# Patient Record
Sex: FEMALE | Race: WHITE | NOT HISPANIC OR LATINO | Employment: FULL TIME
[De-identification: names, ages, dates, MRNs, and addresses within clinical notes are randomized per-mention and may not be internally consistent; named-entity substitution may affect disease eponyms.]

---

## 2017-06-03 ENCOUNTER — HEALTH MAINTENANCE LETTER (OUTPATIENT)
Age: 47
End: 2017-06-03

## 2019-09-29 ENCOUNTER — HEALTH MAINTENANCE LETTER (OUTPATIENT)
Age: 49
End: 2019-09-29

## 2021-01-14 ENCOUNTER — HEALTH MAINTENANCE LETTER (OUTPATIENT)
Age: 51
End: 2021-01-14

## 2021-03-14 ENCOUNTER — HEALTH MAINTENANCE LETTER (OUTPATIENT)
Age: 51
End: 2021-03-14

## 2021-10-23 ENCOUNTER — HEALTH MAINTENANCE LETTER (OUTPATIENT)
Age: 51
End: 2021-10-23

## 2022-02-12 ENCOUNTER — HEALTH MAINTENANCE LETTER (OUTPATIENT)
Age: 52
End: 2022-02-12

## 2022-04-09 ENCOUNTER — HEALTH MAINTENANCE LETTER (OUTPATIENT)
Age: 52
End: 2022-04-09

## 2022-10-10 ENCOUNTER — HEALTH MAINTENANCE LETTER (OUTPATIENT)
Age: 52
End: 2022-10-10

## 2023-03-25 ENCOUNTER — HEALTH MAINTENANCE LETTER (OUTPATIENT)
Age: 53
End: 2023-03-25

## 2023-05-27 ENCOUNTER — HEALTH MAINTENANCE LETTER (OUTPATIENT)
Age: 53
End: 2023-05-27

## 2023-07-07 ENCOUNTER — OFFICE VISIT (OUTPATIENT)
Dept: OBGYN | Facility: CLINIC | Age: 53
End: 2023-07-07
Payer: COMMERCIAL

## 2023-07-07 VITALS
WEIGHT: 152.6 LBS | OXYGEN SATURATION: 100 % | DIASTOLIC BLOOD PRESSURE: 72 MMHG | HEART RATE: 70 BPM | SYSTOLIC BLOOD PRESSURE: 111 MMHG | BODY MASS INDEX: 22.6 KG/M2 | HEIGHT: 69 IN

## 2023-07-07 DIAGNOSIS — E89.0 H/O PARTIAL THYROIDECTOMY: ICD-10-CM

## 2023-07-07 DIAGNOSIS — Z01.419 WELL WOMAN EXAM WITH ROUTINE GYNECOLOGICAL EXAM: Primary | ICD-10-CM

## 2023-07-07 DIAGNOSIS — Z12.11 COLON CANCER SCREENING: ICD-10-CM

## 2023-07-07 DIAGNOSIS — Z13.220 SCREENING CHOLESTEROL LEVEL: ICD-10-CM

## 2023-07-07 DIAGNOSIS — Z12.4 SCREENING FOR MALIGNANT NEOPLASM OF CERVIX: ICD-10-CM

## 2023-07-07 DIAGNOSIS — L98.9 SKIN LESION: ICD-10-CM

## 2023-07-07 DIAGNOSIS — Z13.29 SCREENING FOR THYROID DISORDER: ICD-10-CM

## 2023-07-07 DIAGNOSIS — I82.4Y1 ACUTE DEEP VEIN THROMBOSIS (DVT) OF PROXIMAL VEIN OF RIGHT LOWER EXTREMITY (H): ICD-10-CM

## 2023-07-07 DIAGNOSIS — Z11.3 ROUTINE SCREENING FOR STI (SEXUALLY TRANSMITTED INFECTION): ICD-10-CM

## 2023-07-07 DIAGNOSIS — Z12.31 ENCOUNTER FOR SCREENING MAMMOGRAM FOR BREAST CANCER: ICD-10-CM

## 2023-07-07 DIAGNOSIS — Z13.1 SCREENING FOR DIABETES MELLITUS: ICD-10-CM

## 2023-07-07 PROBLEM — I82.409 ACUTE DEEP VEIN THROMBOSIS (DVT) OF LOWER EXTREMITY (H): Status: ACTIVE | Noted: 2023-07-07

## 2023-07-07 LAB
CHOLEST SERPL-MCNC: 275 MG/DL
HBA1C MFR BLD: 5.3 % (ref 0–5.6)
HDLC SERPL-MCNC: 90 MG/DL
LDLC SERPL CALC-MCNC: 169 MG/DL
NONHDLC SERPL-MCNC: 185 MG/DL
T4 FREE SERPL-MCNC: 0.93 NG/DL (ref 0.9–1.7)
TRIGL SERPL-MCNC: 80 MG/DL
TSH SERPL DL<=0.005 MIU/L-ACNC: 4.63 UIU/ML (ref 0.3–4.2)

## 2023-07-07 PROCEDURE — 84443 ASSAY THYROID STIM HORMONE: CPT

## 2023-07-07 PROCEDURE — 36415 COLL VENOUS BLD VENIPUNCTURE: CPT

## 2023-07-07 PROCEDURE — 84439 ASSAY OF FREE THYROXINE: CPT

## 2023-07-07 PROCEDURE — 87491 CHLMYD TRACH DNA AMP PROBE: CPT

## 2023-07-07 PROCEDURE — 83036 HEMOGLOBIN GLYCOSYLATED A1C: CPT

## 2023-07-07 PROCEDURE — 87591 N.GONORRHOEAE DNA AMP PROB: CPT

## 2023-07-07 PROCEDURE — 87624 HPV HI-RISK TYP POOLED RSLT: CPT

## 2023-07-07 PROCEDURE — G0145 SCR C/V CYTO,THINLAYER,RESCR: HCPCS

## 2023-07-07 PROCEDURE — 99386 PREV VISIT NEW AGE 40-64: CPT

## 2023-07-07 PROCEDURE — 80061 LIPID PANEL: CPT

## 2023-07-07 RX ORDER — ESCITALOPRAM OXALATE 10 MG/1
TABLET ORAL
COMMUNITY
Start: 2023-02-01

## 2023-07-07 NOTE — PROGRESS NOTES
SUBJECTIVE:  Bernarda Escobar is an 53 year old, , who presents for an Annual Preventive Well Woman Exam.     Concerns patient would like to discuss today:   STI screening - Asymptomatic, new sexual partners    GYNECOLOGIC HISTORY:    Her LMP was at age 50.   Patient is currently sexually active with multiple male partner(s).   Patient denies history of STI or PID.    Patient is currently using menopause for contraception.   She does desire STI testing at her visit today.  Patient denies concerns regarding sexual function including concerns regarding sexual arousal, orgasm, or dyspareunia.   Patient denies problems with urination.  Patient denies bowel problems.    Last PAP: Uncertain, possibly 5 years ago  Next PAP due: Overdue per patient  History of abnormal Pap smear: Yes, has had cold knife cone biopsy previously    Mammogram current: yes  Last Mammogram:   MA Screen with Implants Digital Bilat    Result Date: 10/6/2022  Narrative: [facility] MAMMOGRAM SCREENING BILAT W IMPLANTS W CAD performed on 10/6/22 Compared to: 2019 [facility] Mammogram Diag Bilat W Implants W 3D Travon, 2018 [facility] Mammogram Screening Bilat W Implants W CAD, and 2017 [facility] Mammogram Diag Bilat W Implants W Travon   FINDINGS: Bilateral screening mammogram was performed with the assistance of Computer-Aided Detection . The breasts have scattered areas of fibroglandular density. There are findings of breast augmentation. There is no radiographic evidence of malignancy.      Regular self breast exam: Yes  Personal history of breast cancer: No  Family history of breast cancer: No    Personal history of uterine, ovarian, or colon cancer: No  Family history of uterine or ovarian cancer: Yes  Family history of colon cancer: Yes      HEALTH MAINTENANCE HISTORY:    Last Cholesterol:  No results found for: CHOL    Last TSH:  No results found for: TSH     Last Colon Cancer Screening: None prior    Are immunizations up to date: Yes    SOCIAL  HISTORY:  Have you had an eye exam in the last 2 years? Yes  Do you see your dentist at least 1 time per year? No  Do you consume dairy products? Yes  Do you consume meat products? Yes    Do you exercise regularly? Yes    Patient denies tobacco use.  Patient reports 1 drik daily, never more than 4 on spcial occasion alcohol use.    Last PHQ-2 score on record:      2023     2:12 PM   PHQ-2 (  [employer])   Q1: Little interest or pleasure in doing things 0   Q2: Feeling down, depressed or hopeless 0   PHQ-2 Score 0      Last PHQ-9 score on record:        No data to display              Last GAD7 score on record:        No data to display                Do you feel safe where you live? Yes      HISTORY:  Prescription Medications as of 2023       Rx Number Disp Refills Start End Last Dispensed Date Next Fill Date Owning Pharmacy    escitalopram (LEXAPRO) 10 MG tablet    2023        Class: Historical    Levothyroxine Sodium (SYNTHROID PO)            Class: Historical    Route: Oral    Rivaroxaban (XARELTO PO)            Class: Historical    Route: Oral        No Known Allergies  Immunization History   Administered Date(s) Administered     COVID-19 Bivalent 18+ (Moderna) 2022     COVID-19 Monovalent 18+ (Moderna) 2021     COVID-19 Vaccine (Anamaria) 2021       OB History    Para Term  AB Living   3 3 2 1 0 4   SAB IAB Ectopic Multiple Live Births   0 0 0 1 4     Past Medical History:   Diagnosis Date     Thyroid disease      History reviewed. No pertinent surgical history.  History reviewed. No pertinent family history.  Social History     Socioeconomic History     Marital status:      Spouse name: None     Number of children: None     Years of education: None     Highest education level: None   Tobacco Use     Smoking status: Never   Vaping Use     Vaping Use: Never used   Substance and Sexual Activity     Alcohol use: Yes     Comment: weekly     Drug use: No     Sexual  "activity: Yes     Partners: Male     Birth control/protection: None      reports that she has never smoked. She does not have any smokeless tobacco history on file.    REVIEW of SYSTEMS:  ROS: 10 point ROS neg other than the symptoms noted above in the HPI.    EXAM:  Blood pressure 111/72, pulse 70, height 1.74 m (5' 8.5\"), weight 69.2 kg (152 lb 9.6 oz), SpO2 100 %.   Body mass index is 22.86 kg/m .  General - pleasant female in no acute distress.  Skin - POSITIVE for 3 mm brown lesion to right, upper, lateral back. Lesion is rounded, slightly raised, has mild variation in pigmentation and is non-pruritic. Otherwise, no suspicious body lesions or rashes  EENT-  PERRLA,   Neck - supple without lymphadenopathy, euthyroid with out palpable nodules  Lungs - clear to auscultation bilaterally.  Heart - regular rate and rhythm without murmur.  Abdomen - soft, nontender, nondistended, no masses or organomegaly noted.  Musculoskeletal - no gross deformities.  Neurological - normal strength, sensation, and mental status.    Breast Exam:  Breast: Without visible skin changes. No dimpling or lesions seen.   Breasts supple, non-tender with palpation, no dominant mass, nodularity, or nipple discharge noted bilaterally. Axillary nodes negative.      Pelvic Exam:  EG/BUS: Normal genital architecture without lesions, erythema or abnormal secretions Bartholin's, Urethra, [place]'s normal   Urethral meatus: normal   Urethra: no masses, tenderness, or scarring   Bladder: no masses or tenderness   Vagina: atrophic, thin, dry with creamy, white and odorless  secretions  Cervix: no lesions and pink, moist, closed, without lesion or CMT  Uterus: anteverted,  and small, smooth, firm, mobile w/o pain  Adnexa: Within normal limits and No masses, nodularity, tenderness  Rectum: deferred       ASSESSMENT/PLAN:     (Z01.419) Well woman exam with routine gynecological exam  (primary encounter diagnosis)  (I82.4Y1) Acute deep vein thrombosis (DVT) " of proximal vein of right lower extremity (H)  (E89.0) H/O partial thyroidectomy  Comment: Well woman exam in female with history of partial thyroidectomy and distant hx of DVT with short course of Xarelto. She desires STI screening and update of PAP as well as routine wellness screenings and evaluation of a skin lesion on her R upper back.  Plan: Pap imaged thin layer screen with HPV -         recommended age 30 - 65 years (select HPV order        below), TSH with free T4 reflex, NEISSERIA         GONORRHOEA PCR, CHLAMYDIA TRACHOMATIS PCR,         Colonoscopy Screening  Referral, MA         Screen with Implants Bilateral w/Travon, T4 free    (Z12.4) Screening for malignant neoplasm of cervix  Comment: Update of PAP, routine screening.  Plan: Pap imaged thin layer screen with HPV -         recommended age 30 - 65 years (select HPV order        below)    (Z12.11) Colon cancer screening  Comment: No previous colon cancer screening completed in 53 year old female.  Plan: Colonoscopy Screening  Referral    (Z13.29) Screening for thyroid disorder  Comment: Hx of partial thyroidectomy. Not currently taking synthroid.   Plan:    TSH with free T4 reflex,    T4 free    (Z11.3) Routine screening for STI (sexually transmitted infection)  Comment: New sexual partner, asymptomatic. Routine screening.  Plan:    NEISSERIA GONORRHOEA PCR,    CHLAMYDIA TRACHOMATIS PCR    (Z12.31) Encounter for screening mammogram for breast cancer  Comment: Routine screening.  Plan: MA Screen with Implants Bilateral w/Travon    (Z13.1) Screening for diabetes mellitus  Comment: Routine screening.  Plan: Hemoglobin A1c    (Z13.220) Screening cholesterol level  Comment: Routine screening.  Plan: Lipid panel reflex to direct LDL Non-fasting    (L98.9) Skin lesion  Comment: 3 mm brown lesion to right, upper, lateral back. Lesion is rounded, slightly raised, has mild variation in pigmentation and is non-pruritic.  Plan: Adult Dermatology  "Referral    Additional teaching done at this visit regarding calcium (1200 mg per day), self breast exam and exercise.      STD testing offered?  Accepted    Diet: Specific dietary guidelines may vary, but in general, a healthy diet includes intake of fruits, vegetables, legumes, nuts, and whole grains each day. A healthy diet includes a variety of protein-rich foods which might include lean cuts of meat (turkey, chicken), tofu, legumes, beans, nuts, seeds, dairy, and some fish. You should limit or avoid red and processed meats, unhealthy fats (saturated and trans-fats), sugar, sodium, and alcohol.      Exercise: The American Heart Association recommends you get at least 150 minutes of moderate-intensity aerobic exercise per week, spread across several days. They also recommend women participate in strength-training exercises (such as resistance or weights) 2 days each week.    Calcium and Vitamin D: We recommend you try to get 9800-5610 mg of calcium (total of diet and supplement) and 600-800 international units of vitamin D daily. We recommend 3 servings of calcium and vitamin D-rich foods daily to achieve this.  Good sources of calcium include:    Milk, yogurt, and cheese    Certain green leafy vegetables, such as kale, spinach, bok jaya, and brian greens    Fish with bones, such as canned salmon, mackerel, and sardines    Tofu made with calcium carbonate (not the type of tofu called nagiri)    Drinks that have calcium added, such as some orange juice and rice and soy drinks  Good dietary sources of include:    Milk, fortified with Vitamin D    Health Maintenance  The following topics were discussed or recommended:  - Kegel exercises  - Seat belt use, helmet use, and sunscreen use  - Routine vision screening  - Twice-annual dental visits  - Mammogram, per guidelines and provider recommendations  - Cervical Cancer Screenings (\"PAP Smear\") per guidelines and provider recommendations  - Colon Cancer Screenings at " age 45 (may be recommended earlier if you have a family history of colon cancer/disease)  - Calcium/Vitamin D supplement: Recommend 5607-7412 mg of calcium daily and 600-800 IU of vitamin D (total of diet and supplement).  - Folic Acid 400 - 800 mcg daily for women of childbearing age to prevent neural tube defects       Return to clinic in one year.  Follow-up as needed.    INDIGO Mendoza CNP

## 2023-07-08 PROBLEM — E89.0 H/O PARTIAL THYROIDECTOMY: Status: ACTIVE | Noted: 2023-07-08

## 2023-07-08 LAB
C TRACH DNA SPEC QL NAA+PROBE: NEGATIVE
N GONORRHOEA DNA SPEC QL NAA+PROBE: NEGATIVE

## 2023-07-08 NOTE — PATIENT INSTRUCTIONS
If you have any questions regarding your visit, please contact your care team.     [facility]: [phone]  Women s Health CLINIC HOURS TELEPHONE NUMBER     Eva Muñoz, RAVINDER, APRN, WHNP-BC     - Certified Medical Assistant    Anna - ISAURA Chowdhury - ISAURA Paiz - ISAURA Cardoza -   Lianna -     Monday- Friday: [place]  8:00 a.m - 4:00 p.m         [facility]  [address] [place] [address]  Phone: [phone]   Fax: [phone]   Imaging Scheduling- [phone]    [facility]  [address] [place] [address]  Phone: [phone]  Language Assist Line: [phone]  Fax:   Imaging Scheduling- [phone]    [facility]  [address] [place] [address]   [place] [address] [phone]     **Surgeries** Our Surgery Schedulers will contact you to schedule. If you do not receive a call within 3 business days, please call [phone].    Urgent Care locations:  [facility] Monday-Friday  10 am - 8 pm  Saturday and Sunday   9 am - 5 pm  Monday-Friday   10 am - 8 pm  Saturday and Sunday   9 am - 5 pm   [phone]     If you need a medication refill, please contact your pharmacy. Please allow 3 business days for your refill to be completed.  As always, Thank you for trusting us with your healthcare needs!    see additional instructions from your care team below     Routine Well Woman Recommendations    Diet: Specific dietary guidelines may vary, but in general, a healthy diet includes intake of fruits, vegetables, legumes, nuts, and whole grains each day. A healthy diet includes a variety of protein-rich foods which might include lean cuts of meat (turkey, chicken), tofu, legumes, beans, nuts, seeds, dairy, and some fish. You should limit or avoid red and processed meats, unhealthy fats (saturated and trans-fats), sugar, sodium, and alcohol.      Exercise: The  "American Heart Association recommends you get at least 150 minutes of moderate-intensity aerobic exercise per week, spread across several days. They also recommend women participate in strength-training exercises (such as resistance or weights) 2 days each week.    Calcium and Vitamin D: We recommend you try to get 3867-1320 mg of calcium (total of diet and supplement) and 600-800 international units of vitamin D daily. We recommend 3 servings of calcium and vitamin D-rich foods daily to achieve this.  Good sources of calcium include:  Milk, yogurt, and cheese  Certain green leafy vegetables, such as kale, spinach, bok jaya, and brian greens  Fish with bones, such as canned salmon, mackerel, and sardines  Tofu made with calcium carbonate (not the type of tofu called nagiri)  Drinks that have calcium added, such as some orange juice and rice and soy drinks  Good dietary sources of include:  Milk, fortified with Vitamin D    Health Maintenance  The following topics were discussed or recommended:  - Kegel exercises  - Seat belt use, helmet use, and sunscreen use  - Routine vision screening  - Twice-annual dental visits  - Mammogram, per guidelines and provider recommendations  - Cervical Cancer Screenings (\"PAP Smear\") per guidelines and provider recommendations  - Colon Cancer Screenings at age 45 (may be recommended earlier if you have a family history of colon cancer/disease)  - Calcium/Vitamin D supplement: Recommend 7438-6937 mg of calcium daily and 600-800 IU of vitamin D (total of diet and supplement).  - Folic Acid 400 - 800 mcg daily for women of childbearing age to prevent neural tube defects       Return to clinic in one year.  Follow-up as needed.    "

## 2023-07-11 LAB
BKR LAB AP GYN ADEQUACY: NORMAL
BKR LAB AP GYN INTERPRETATION: NORMAL
BKR LAB AP HPV REFLEX: NORMAL
BKR LAB AP PREVIOUS ABNL DX: NORMAL
BKR LAB AP PREVIOUS ABNORMAL: NORMAL
PATH REPORT.COMMENTS IMP SPEC: NORMAL
PATH REPORT.COMMENTS IMP SPEC: NORMAL
PATH REPORT.RELEVANT HX SPEC: NORMAL

## 2023-07-13 LAB
HUMAN PAPILLOMA VIRUS 16 DNA: NEGATIVE
HUMAN PAPILLOMA VIRUS 18 DNA: NEGATIVE
HUMAN PAPILLOMA VIRUS FINAL DIAGNOSIS: NORMAL
HUMAN PAPILLOMA VIRUS OTHER HR: NEGATIVE

## 2023-09-14 ENCOUNTER — TELEPHONE (OUTPATIENT)
Dept: GASTROENTEROLOGY | Facility: CLINIC | Age: 53
End: 2023-09-14
Payer: COMMERCIAL

## 2023-09-14 NOTE — TELEPHONE ENCOUNTER
"Endoscopy Scheduling Screen    Have you had a positive Covid test in the last 14 days?  No    Are you active on MyChart?   Yes    What insurance is in the chart?  Other:      Ordering/Referring Provider:   JAKOB SANCHEZ        (If ordering provider performs procedure, schedule with ordering provider unless otherwise instructed. )    BMI: Estimated body mass index is 22.86 kg/m  as calculated from the following:    Height as of 7/7/23: 1.74 m (5' 8.5\").    Weight as of 7/7/23: 69.2 kg (152 lb 9.6 oz).     Sedation Ordered  moderate sedation.   If patient BMI > 50 do not schedule in [facility].    If patient BMI > 45 do not schedule at [facility].    Are you taking methadone or Suboxone?  No    Are you taking any prescription medications for pain 3 or more times per week?   No    Do you have a history of malignant hyperthermia or adverse reaction to anesthesia?  No    (Females) Are you currently pregnant?   No     Have you been diagnosed or told you have pulmonary hypertension?   No    Do you have an LVAD?  No    Have you been told you have moderate to severe sleep apnea?  No    Have you been told you have COPD, asthma, or any other lung disease?  No    Do you have any heart conditions?  No     Have you ever had an organ transplant?   No    Have you ever had or are you awaiting a heart or lung transplant?   No    Have you had a stroke or transient ischemic attack (TIA aka \"mini stroke\" in the last 6 months?   No    Have you been diagnosed with or been told you have cirrhosis of the liver?   No    Are you currently on dialysis?   No    Do you need assistance transferring?   No    BMI: Estimated body mass index is 22.86 kg/m  as calculated from the following:    Height as of 7/7/23: 1.74 m (5' 8.5\").    Weight as of 7/7/23: 69.2 kg (152 lb 9.6 oz).     Is patients BMI > 40 and scheduling location [facility]?  No    Do you take an injectable medication for weight loss or diabetes (excluding insulin)?  No    Do you take the " medication Naltrexone?  No    Do you take blood thinners?  No       Prep   Are you currently on dialysis or do you have chronic kidney disease?  No    Do you have a diagnosis of diabetes?  No    Do you have a diagnosis of cystic fibrosis (CF)?  No    On a regular basis do you go 3 -5 days between bowel movements?  No    BMI > 40?  No    Preferred Pharmacy:    [id number] - [place], [place] - [address] [address] AT [facility] OF  & [place] [facility]  [address] [place] [address]  [place] [address]  Phone: [phone] Fax: [phone]      Final Scheduling Details   Colonoscopy prep sent?  Standard MiraLAX    Procedure scheduled  Colonoscopy    Surgeon:  JUSTINA     Date of procedure:  11/09/2023     Pre-OP / PAC:   No - Not required for this site.    Location  [facility] - [facility] - Patient preference.    Sedation   Moderate Sedation - Per order.      Patient Reminders:   You will receive a call from a Nurse to review instructions and health history.  This assessment must be completed prior to your procedure.  Failure to complete the Nurse assessment may result in the procedure being cancelled.      On the day of your procedure, please designate an adult(s) who can drive you home stay with you for the next 24 hours. The medicines used in the exam will make you sleepy. You will not be able to drive.      You cannot take public transportation, ride share services, or non-medical taxi service without a responsible caregiver.  Medical transport services are allowed with the requirement that a responsible caregiver will receive you at your destination.  We require that drivers and caregivers are confirmed prior to your procedure.

## 2023-10-30 ENCOUNTER — TELEPHONE (OUTPATIENT)
Dept: GASTROENTEROLOGY | Facility: CLINIC | Age: 53
End: 2023-10-30
Payer: COMMERCIAL

## 2023-10-30 NOTE — TELEPHONE ENCOUNTER
Pre visit planning completed.      Procedure details:    Patient scheduled for Colonoscopy  on 11.9.2023.     Arrival time: 0945. Procedure time 1030    Pre op exam needed? N/A    Facility location: [facility] [place]; [address], [place] [address]    Sedation type: Conscious sedation     Indication for procedure: screening colonoscpy      Chart review:     Electronic implanted devices? No    Recent diagnosis of diverticulitis within the last 6 weeks? No    Diabetic? No    Diabetic medication HOLDING recommendations: (if applicable)  Oral diabetic medications: N/A  Diabetic injectables: N/A  Insulin: N/A      Medication review:    Anticoagulants? No    NSAIDS? No NSAID medications per patient's medication list.  RN will verify with pre-assessment call.    Other medication HOLDING recommendations:  N/A      Prep for procedure:     Bowel prep recommendation: Standard Miralax   Due to:  standard bowel prep.    Prep instructions sent via [employer]         Juana Auguste RN  Endoscopy Procedure Pre Assessment RN  [phone] option 4

## 2023-10-30 NOTE — TELEPHONE ENCOUNTER
Pre assessment completed for upcoming procedure.   (Please see previous telephone encounter notes for complete details)    Patient  returned call.       Procedure details:    Arrival time and facility location reviewed.    Pre op exam needed? N/A    Designated  policy reviewed. Instructed to have someone stay 6 hours post procedure.     COVID policy reviewed.      Medication review:    Medications reviewed. Please see supporting documentation below. Holding recommendations discussed (if applicable).   N/A      Prep for procedure:     Procedure prep instructions reviewed.        Additional information needed?  N/A      Patient  verbalized understanding and had no questions or concerns at this time.      Eva Doss RN  Endoscopy Procedure Pre Assessment RN  [phone] option 4

## 2023-10-30 NOTE — TELEPHONE ENCOUNTER
Attempted to contact patient in order to complete pre assessment questions.     No answer. Left message to return call to [phone] option 4    Juana Auguste RN  Endoscopy Procedure Pre Assessment RN

## 2023-11-09 ENCOUNTER — HOSPITAL ENCOUNTER (OUTPATIENT)
Facility: AMBULATORY SURGERY CENTER | Age: 53
Discharge: HOME | End: 2023-11-09
Attending: INTERNAL MEDICINE | Admitting: INTERNAL MEDICINE
Payer: COMMERCIAL

## 2023-11-09 VITALS
OXYGEN SATURATION: 99 % | DIASTOLIC BLOOD PRESSURE: 77 MMHG | SYSTOLIC BLOOD PRESSURE: 106 MMHG | TEMPERATURE: 97.4 F | HEART RATE: 55 BPM | RESPIRATION RATE: 16 BRPM

## 2023-11-09 LAB — COLONOSCOPY: NORMAL

## 2023-11-09 PROCEDURE — G8907 PT DOC NO EVENTS ON DISCHARG: HCPCS

## 2023-11-09 PROCEDURE — 45378 DIAGNOSTIC COLONOSCOPY: CPT

## 2023-11-09 PROCEDURE — G8918 PT W/O PREOP ORDER IV AB PRO: HCPCS

## 2023-11-09 RX ORDER — NALOXONE HYDROCHLORIDE 0.4 MG/ML
0.2 INJECTION, SOLUTION INTRAMUSCULAR; INTRAVENOUS; SUBCUTANEOUS
Status: DISCONTINUED | OUTPATIENT
Start: 2023-11-09 | End: 2023-11-10 | Stop reason: HOSPADM

## 2023-11-09 RX ORDER — NALOXONE HYDROCHLORIDE 0.4 MG/ML
0.4 INJECTION, SOLUTION INTRAMUSCULAR; INTRAVENOUS; SUBCUTANEOUS
Status: DISCONTINUED | OUTPATIENT
Start: 2023-11-09 | End: 2023-11-10 | Stop reason: HOSPADM

## 2023-11-09 RX ORDER — ONDANSETRON 2 MG/ML
4 INJECTION INTRAMUSCULAR; INTRAVENOUS EVERY 6 HOURS PRN
Status: DISCONTINUED | OUTPATIENT
Start: 2023-11-09 | End: 2023-11-10 | Stop reason: HOSPADM

## 2023-11-09 RX ORDER — FENTANYL CITRATE 50 UG/ML
INJECTION, SOLUTION INTRAMUSCULAR; INTRAVENOUS PRN
Status: DISCONTINUED | OUTPATIENT
Start: 2023-11-09 | End: 2023-11-09 | Stop reason: HOSPADM

## 2023-11-09 RX ORDER — PROCHLORPERAZINE MALEATE 10 MG
10 TABLET ORAL EVERY 6 HOURS PRN
Status: DISCONTINUED | OUTPATIENT
Start: 2023-11-09 | End: 2023-11-10 | Stop reason: HOSPADM

## 2023-11-09 RX ORDER — FLUMAZENIL 0.1 MG/ML
0.2 INJECTION, SOLUTION INTRAVENOUS
Status: ACTIVE | OUTPATIENT
Start: 2023-11-09 | End: 2023-11-09

## 2023-11-09 RX ORDER — LIDOCAINE 40 MG/G
CREAM TOPICAL
Status: DISCONTINUED | OUTPATIENT
Start: 2023-11-09 | End: 2023-11-10 | Stop reason: HOSPADM

## 2023-11-09 RX ORDER — ONDANSETRON 2 MG/ML
4 INJECTION INTRAMUSCULAR; INTRAVENOUS
Status: DISCONTINUED | OUTPATIENT
Start: 2023-11-09 | End: 2023-11-10 | Stop reason: HOSPADM

## 2023-11-09 RX ORDER — ONDANSETRON 4 MG/1
4 TABLET, ORALLY DISINTEGRATING ORAL EVERY 6 HOURS PRN
Status: DISCONTINUED | OUTPATIENT
Start: 2023-11-09 | End: 2023-11-10 | Stop reason: HOSPADM

## 2023-11-09 RX ORDER — SODIUM CHLORIDE, SODIUM LACTATE, POTASSIUM CHLORIDE, CALCIUM CHLORIDE 600; 310; 30; 20 MG/100ML; MG/100ML; MG/100ML; MG/100ML
INJECTION, SOLUTION INTRAVENOUS CONTINUOUS
Status: DISCONTINUED | OUTPATIENT
Start: 2023-11-09 | End: 2023-11-10 | Stop reason: HOSPADM

## 2023-11-09 RX ADMIN — SODIUM CHLORIDE, SODIUM LACTATE, POTASSIUM CHLORIDE, CALCIUM CHLORIDE: 600; 310; 30; 20 INJECTION, SOLUTION INTRAVENOUS at 10:17

## 2023-11-09 NOTE — H&P
[facility] Anesthesia Pre-op History and Physical    Bernarda Lane MRN# [id number]   Age: 53 year old YOB: 1970            Date of Exam 11/9/2023         Primary care provider: Clinic, [facility] [place] [facility] [place]         Chief Complaint and/or Reason for Procedure:     CRC screening - first colonoscopy         Active problem list:     Patient Active Problem List    Diagnosis Date Noted    H/O partial thyroidectomy 07/08/2023     Priority: Medium    Acute deep vein thrombosis (DVT) of lower extremity (H) 07/07/2023     Priority: Medium            Medications (include herbals and vitamins):   Any Plavix use in the last 7 days? No     Current Outpatient Medications   Medication Sig    escitalopram (LEXAPRO) 10 MG tablet      Current Facility-Administered Medications   Medication    lactated ringers infusion    lidocaine (LMX4) kit    lidocaine 1 % 0.1-1 mL    ondansetron (ZOFRAN) injection 4 mg    sodium chloride (PF) 0.9% PF flush 3 mL    sodium chloride (PF) 0.9% PF flush 3 mL             Allergies:    No Known Allergies  Allergy to Latex?   Allergy to tape?   No  Intolerances:             Physical Exam:   All vitals have been reviewed  Patient Vitals for the past 8 hrs:   BP Temp Temp src Pulse Resp SpO2   11/09/23 1008 110/74 97.4  F (36.3  C) Temporal 59 16 99 %     No intake/output data recorded.  Lungs:   No increased work of breathing, good air exchange, clear to auscultation bilaterally, no crackles or wheezing     Cardiovascular:   normal S1 and S2             Lab / Radiology Results:            Anesthetic risk and/or ASA classification:   2    Alexia Berkowitz DO

## 2024-01-07 ENCOUNTER — HEALTH MAINTENANCE LETTER (OUTPATIENT)
Age: 54
End: 2024-01-07

## 2024-02-01 ENCOUNTER — OFFICE VISIT (OUTPATIENT)
Dept: FAMILY MEDICINE | Facility: CLINIC | Age: 54
End: 2024-02-01
Payer: COMMERCIAL

## 2024-02-01 DIAGNOSIS — L82.1 SEBORRHEIC KERATOSIS: ICD-10-CM

## 2024-02-01 PROCEDURE — 99202 OFFICE O/P NEW SF 15 MIN: CPT | Performed by: PHYSICIAN ASSISTANT

## 2024-02-01 NOTE — LETTER
2/1/2024         RE: Bernarda Lane  [address] [place] [address]        Dear Colleague,    Thank you for referring your patient, Bernarda Lane, to the [facility]. Please see a copy of my visit note below.    [facility] Dermatology Note  Encounter Date: Feb 1, 2024  Office Visit      Dermatology Problem List:  1. Seborrheic keratosis, right posterior shoulder  ____________________________________________    Assessment & Plan:  # Seborrheic keratosis, non irritated.   - Discussed the natural history and benign nature of this lesion. Reassurance provided that no additional treatment is necessary.       Follow-up: prn for new or changing lesions    Staff and scribe     Scribe Disclosure:   I, MIRANDA RIVERA, am serving as a scribe; to document services personally performed by Laura Barton PA-C -based on data collection and the provider's statements to me.     Provider Disclosure:  I agree with above History, Review of Systems, Physical exam and Plan.  I have reviewed the content of the documentation and have edited it as needed. I have personally performed the services documented here and the documentation accurately represents those services and the decisions I have made.      Electronically signed by:  Delilah Holland PA-C  [facility]   Dermatology     All risks, benefits and alternatives were discussed with patient.  Patient is in agreement and understands the assessment and plan.  All questions were answered.      Laura Barton PA-C, MPAS  [facility]: Phone: [phone], Fax: [phone]  [facility]: Phone: [phone],  Fax: [phone]  [facility]: Phone: [phone], Fax: [phone]        CC: Derm Problem (Spot only check back)      Reviewed patients past medical history and pertinent chart review prior to patient's  visit today.     HPI:  Ms. Bernarda Lane is a 53 year old female who presents today as a new patient for spot check.     Today patient reported a spot of concern on right posterior shoulder. This lesion is new over the post year and is asymptomatic.    Patient is otherwise feeling well, without additional concerns.    Labs:  N/A    Physical Exam:  Vitals: There were no vitals taken for this visit.  SKIN: Focused examination of back was performed.   - right posterior shoulder, waxy, stuck on tan/brown papules and patches  - No other lesions of concern on areas examined.     Medications:  Current Outpatient Medications   Medication     escitalopram (LEXAPRO) 10 MG tablet     No current facility-administered medications for this visit.      Past Medical/Surgical History:   Patient Active Problem List   Diagnosis     Acute deep vein thrombosis (DVT) of lower extremity (H)     H/O partial thyroidectomy     Past Medical History:   Diagnosis Date     Thyroid disease                         Again, thank you for allowing me to participate in the care of your patient.        Sincerely,        Laura Barton PA-C

## 2024-02-01 NOTE — PATIENT INSTRUCTIONS
Patient Education       Proper skin care from [facility]:    -Eliminate harsh soaps as they strip the natural oils from the skin, often resulting in dry itchy skin ( i.e. Dial, Zest, [place] Spring)  -Use mild soaps such as Cetaphil or Dove Sensitive Skin in the shower. You do not need to use soap on arms, legs, and trunk every time you shower unless visibly soiled.   -Avoid hot or cold showers.  -After showering, lightly dry off and apply moisturizing within 2-3 minutes. This will help trap moisture in the skin.   -Aggressive use of a moisturizer at least 1-2 times a day to the entire body (including -Vanicream, Cetaphil, Aquaphor or Cerave) and moisturize hands after every washing.  -We recommend using moisturizers that come in a tub that needs to be scooped out, not a pump. This has more of an oil base. It will hold moisture in your skin much better than a water base moisturizer. The above recommended are non-pore clogging.      Wear a sunscreen with at least SPF 30 on your face, ears, neck and V of the chest daily. Wear sunscreen on other areas of the body if those areas are exposed to the sun throughout the day. Sunscreens can contain physical and/or chemical blockers. Physical blockers are less likely to clog pores, these include zinc oxide and titanium dioxide. Reapply every two hour and after swimming.     Sunscreen examples: https://www.ewg.org/sunscreen/    UV radiation  UVA radiation remains constant throughout the day and throughout the year. It is a longer wavelength than UVB and therefore penetrates deeper into the skin leading to immediate and delayed tanning, photoaging, and skin cancer. 70-80% of UVA and UVB radiation occurs between the hours of 10am-2pm.  UVB radiation  UVB radiation causes the most harmful effects and is more significant during the summer months. However, snow and ice can reflect UVB radiation leading to skin damage during the winter months as well. UVB radiation is  responsible for tanning, burning, inflammation, delayed erythema (pinkness), pigmentation (brown spots), and skin cancer.     I recommend self monthly full body exams and yearly full body exams with a dermatology provider. If you develop a new or changing lesion please follow up for examination. Most skin cancers are pink and scaly or pink and pearly. However, we do see blue/brown/black skin cancers.  Consider the ABCDEs of melanoma when giving yourself your monthly full body exam ( don't forget the groin, buttocks, feet, toes, etc). A-asymmetry, B-borders, C-color, D-diameter, E-elevation or evolving. If you see any of these changes please follow up in clinic. If you cannot see your back I recommend purchasing a hand held mirror to use with a larger wall mirror.       Checking for Skin Cancer  You can find cancer early by checking your skin each month. There are 3 kinds of skin cancer. They are melanoma, basal cell carcinoma, and squamous cell carcinoma. Doing monthly skin checks is the best way to find new marks or skin changes. Follow the instructions below for checking your skin.   The ABCDEs of checking moles for melanoma   Check your moles or growths for signs of melanoma using ABCDE:   Asymmetry: the sides of the mole or growth don t match  Border: the edges are ragged, notched, or blurred  Color: the color within the mole or growth varies  Diameter: the mole or growth is larger than 6 mm (size of a pencil eraser)  Evolving: the size, shape, or color of the mole or growth is changing (evolving is not shown in the images below)    Checking for other types of skin cancer  Basal cell carcinoma or squamous cell carcinoma have symptoms such as:     A spot or mole that looks different from all other marks on your skin  Changes in how an area feels, such as itching, tenderness, or pain  Changes in the skin's surface, such as oozing, bleeding, or scaliness  A sore that does not heal  New swelling or redness beyond  the border of a mole    Who s at risk?  Anyone can get skin cancer. But you are at greater risk if you have:   Fair skin, light-colored hair, or light-colored eyes  Many moles or abnormal moles on your skin  A history of sunburns from sunlight or tanning beds  A family history of skin cancer  A history of exposure to radiation or chemicals  A weakened immune system  If you have had skin cancer in the past, you are at risk for recurring skin cancer.   How to check your skin  Do your monthly skin checkups in front of a full-length mirror. Check all parts of your body, including your:   Head (ears, face, neck, and scalp)  Torso (front, back, and sides)  Arms (tops, undersides, upper, and lower armpits)  Hands (palms, backs, and fingers, including under the nails)  Buttocks and genitals  Legs (front, back, and sides)  Feet (tops, soles, toes, including under the nails, and between toes)  If you have a lot of moles, take digital photos of them each month. Make sure to take photos both up close and from a distance. These can help you see if any moles change over time.   Most skin changes are not cancer. But if you see any changes in your skin, call your doctor right away. Only he or she can diagnose a problem. If you have skin cancer, seeing your doctor can be the first step toward getting the treatment that could save your life.   [employer] last reviewed this educational content on 4/1/2019 2000-2020 [employer]. [address] [place] [address], [place] [address]. All rights reserved. This information is not intended as a substitute for professional medical care. Always follow your healthcare professional's instructions.       When should I call my doctor?  If you are worsening or not improving, please, contact us or seek urgent care as noted below.     Who should I call with questions (adults)?  [facility] (adult and pediatric): [phone]  [facility]  [place] (adult): [phone]  [facility] ([place], [place], [place] and [place]) [phone]  For urgent needs outside of business hours call the [facility] at [phone] and ask for the dermatology resident on call to be paged  If this is a medical emergency and you are unable to reach an ER, Call 911      If you need a prescription refill, please contact your pharmacy. Refills are approved or denied by our Physicians during normal business hours, Monday through Fridays  Per office policy, refills will not be granted if you have not been seen within the past year (or sooner depending on your child's condition)

## 2024-02-01 NOTE — PROGRESS NOTES
[facility] Dermatology Note  Encounter Date: Feb 1, 2024  Office Visit     Reviewed patients past medical history and pertinent chart review prior to patients visit today.     Dermatology Problem List:  Seborrheic keratosis, right posterior shoulder  _________________________    Assessment & Plan:     # Seborrheic keratosis   - Benign, no further treatment needed. Can continue with observation. If lesion becomes bothersome could treat with cryotherapy in the future.    Delilah Holland PA-C  [facility]  Dermatology      ***  _______________________________________    CC: Derm Problem (Spot only check back)    HPI:  Ms. Bernarda Lane is a(n) 53 year old female who presents today as a new patient for a lesion of concern on the right posterior shoulder. This lesion has been present for about a year. It is asymptomatic.     Patient is otherwise feeling well, without additional skin concerns.      Physical Exam:  SKIN: Focused examination of back was performed.  - right posterior shoulder, tan, waxy, stuck on appearing papule    - No other lesions of concern on areas examined.     Medications:  Current Outpatient Medications   Medication    escitalopram (LEXAPRO) 10 MG tablet     No current facility-administered medications for this visit.      Past Medical History:   Patient Active Problem List   Diagnosis    Acute deep vein thrombosis (DVT) of lower extremity (H)    H/O partial thyroidectomy     Past Medical History:   Diagnosis Date    Thyroid disease        CC Eva Parker, APRN CNP  [address] [place] [address] on close of this encounter.

## 2024-02-01 NOTE — PROGRESS NOTES
[facility] Dermatology Note  Encounter Date: Feb 1, 2024  Office Visit      Dermatology Problem List:  1. Seborrheic keratosis, right posterior shoulder  ____________________________________________    Assessment & Plan:  # Seborrheic keratosis, non irritated.   - Discussed the natural history and benign nature of this lesion. Reassurance provided that no additional treatment is necessary.       Follow-up: prn for new or changing lesions    Staff and scribe     Scribe Disclosure:   I, MIRANDA RIVERA, am serving as a scribe; to document services personally performed by Laura Barton PA-C -based on data collection and the provider's statements to me.     Provider Disclosure:  I agree with above History, Review of Systems, Physical exam and Plan.  I have reviewed the content of the documentation and have edited it as needed. I have personally performed the services documented here and the documentation accurately represents those services and the decisions I have made.      Electronically signed by:  Delilah Holland PA-C  [facility]   Dermatology     All risks, benefits and alternatives were discussed with patient.  Patient is in agreement and understands the assessment and plan.  All questions were answered.      Laura Barton PA-C, MPAS  [facility]: Phone: [phone], Fax: [phone]  [facility]: Phone: [phone],  Fax: [phone]  [facility]: Phone: [phone], Fax: [phone]        CC: Derm Problem (Spot only check back)      Reviewed patients past medical history and pertinent chart review prior to patient's visit today.     HPI:  Ms. Bernarda Lane is a 53 year old female who presents today as a new patient for spot check.     Today patient reported a spot of concern on right posterior shoulder. This lesion is new over the post year and is  asymptomatic.    Patient is otherwise feeling well, without additional concerns.    Labs:  N/A    Physical Exam:  Vitals: There were no vitals taken for this visit.  SKIN: Focused examination of back was performed.   - right posterior shoulder, waxy, stuck on tan/brown papules and patches  - No other lesions of concern on areas examined.     Medications:  Current Outpatient Medications   Medication    escitalopram (LEXAPRO) 10 MG tablet     No current facility-administered medications for this visit.      Past Medical/Surgical History:   Patient Active Problem List   Diagnosis    Acute deep vein thrombosis (DVT) of lower extremity (H)    H/O partial thyroidectomy     Past Medical History:   Diagnosis Date    Thyroid disease

## 2024-10-13 ENCOUNTER — HEALTH MAINTENANCE LETTER (OUTPATIENT)
Age: 54
End: 2024-10-13

## 2024-12-21 ENCOUNTER — HEALTH MAINTENANCE LETTER (OUTPATIENT)
Age: 54
End: 2024-12-21

## (undated) DEVICE — PAD CHUX UNDERPAD 23X24" 7136

## (undated) DEVICE — KIT ENDO FIRST STEP DISINFECTANT 200ML W/POUCH EP-4

## (undated) DEVICE — PREP CHLORAPREP 26ML TINTED ORANGE  260815

## (undated) RX ORDER — FENTANYL CITRATE 50 UG/ML
INJECTION, SOLUTION INTRAMUSCULAR; INTRAVENOUS
Status: DISPENSED
Start: 2023-11-09